# Patient Record
Sex: FEMALE | Race: BLACK OR AFRICAN AMERICAN
[De-identification: names, ages, dates, MRNs, and addresses within clinical notes are randomized per-mention and may not be internally consistent; named-entity substitution may affect disease eponyms.]

---

## 2018-10-14 ENCOUNTER — HOSPITAL ENCOUNTER (EMERGENCY)
Dept: HOSPITAL 39 - ER | Age: 63
Discharge: ANOTHER HOSPITAL | End: 2018-10-14
Payer: SELF-PAY

## 2018-10-14 VITALS — DIASTOLIC BLOOD PRESSURE: 74 MMHG | TEMPERATURE: 100.7 F | SYSTOLIC BLOOD PRESSURE: 140 MMHG

## 2018-10-14 VITALS — OXYGEN SATURATION: 100 %

## 2018-10-14 DIAGNOSIS — R00.0: ICD-10-CM

## 2018-10-14 DIAGNOSIS — J96.00: Primary | ICD-10-CM

## 2018-10-14 DIAGNOSIS — Z88.5: ICD-10-CM

## 2018-10-14 DIAGNOSIS — I10: ICD-10-CM

## 2018-10-14 DIAGNOSIS — K21.9: ICD-10-CM

## 2018-10-14 DIAGNOSIS — R41.82: ICD-10-CM

## 2018-10-14 DIAGNOSIS — Z99.11: ICD-10-CM

## 2018-10-14 DIAGNOSIS — G12.21: ICD-10-CM

## 2018-10-14 DIAGNOSIS — Z88.6: ICD-10-CM

## 2018-10-14 PROCEDURE — 82550 ASSAY OF CK (CPK): CPT

## 2018-10-14 PROCEDURE — 70450 CT HEAD/BRAIN W/O DYE: CPT

## 2018-10-14 PROCEDURE — 84484 ASSAY OF TROPONIN QUANT: CPT

## 2018-10-14 PROCEDURE — 80048 BASIC METABOLIC PNL TOTAL CA: CPT

## 2018-10-14 PROCEDURE — 85025 COMPLETE CBC W/AUTO DIFF WBC: CPT

## 2018-10-14 PROCEDURE — 94002 VENT MGMT INPAT INIT DAY: CPT

## 2018-10-14 PROCEDURE — 82553 CREATINE MB FRACTION: CPT

## 2018-10-14 PROCEDURE — 71045 X-RAY EXAM CHEST 1 VIEW: CPT

## 2018-10-14 PROCEDURE — 93005 ELECTROCARDIOGRAM TRACING: CPT

## 2018-10-14 PROCEDURE — 85730 THROMBOPLASTIN TIME PARTIAL: CPT

## 2018-10-14 PROCEDURE — 85610 PROTHROMBIN TIME: CPT

## 2018-10-14 NOTE — RAD
EXAM:



Single view chest.



INDICATION:  



Dyspnea.



COMPARISON: 



Chest x-ray: None.



FINDINGS:



Cardiac silhouette:  Unremarkable.



Courtney:  Unremarkable.



Lobar consolidation:  None.

Pleural effusion:  None.

Pneumothorax:  None.

Other:  Right PICC terminates within the SVC. A tracheostomy tube

is in place



Bones:  Unremarkable.



Other:  None.



IMPRESSION:



1. No acute cardiopulmonary process.



Electronically signed by:  Wilbert Dumont MD  10/14/2018 5:16 AM CDT

Workstation: [id number]

## 2018-10-14 NOTE — CT
CT head without contrast on 10/14/2018 



CLINICAL INDICATION: Altered mental status status post CPR 



TECHNIQUE: Multiple axial images are obtained throughout the head

without the administration of contrast. This exam was performed

according to our departmental dose-optimization program, which

includes automated exposure control, adjustment of the mA and/or

kV according to patient size and/or use of iterative

reconstruction technique. 

Total DLP is 859.97 mGy*cm.



COMPARISON: None



FINDINGS: There is mild generalized cerebral atrophy. There is no

hydrocephalus. There is no CT evidence of acute infarct. There is

no hemorrhage. Good gray-white differentiation is noted. There

are no abnormal extra-axial fluid collections. There is no mass,

mass effect or midline shift. No bony abnormality is noted.



IMPRESSION: Atrophy with no acute intracranial abnormality.



Electronically signed by:  Anton Britton  10/14/2018 5:32 AM

CDT Workstation: [id number]

## 2018-10-14 NOTE — ED.PDOC
History of Present Illness





- General


Chief Complaint: Respiratory Problem


Stated Complaint: resp distress, vent dependent, Hx ALS


Time Seen by Provider: 10/14/18 04:46


Source: Vital Signs reviewed, EMS


Exam Limitations: clinical condition, physical impairment





- History of Present Illness


Initial Comments: 





Reportedly she is a patient with ALS (vent dependent) who is new to a local 

facility. She had been in her usual state of health until tonight when the 

nurses heard her vent alarms. They found her with central cyanosis & pulseless. 

They started CPR & called EMS. EMS reports that when they arrived CPR was being 

performed but on a pulse check she had a pulse & a normal rhythm on the 

monitor. She was dyspneic but that resolved after they suctioned a mucus plug 

from her tube. Since then there have been no changes. However, by report her 

baseline mental status is that she can at least open her eyes spontaneously.


Timing/Duration: unsure


Severity: severe


Improving Factors: other - suctioning


Worsening Factors: nothing


Allergies/Adverse Reactions: 


Allergies





Aspirin Allergy (Verified 10/14/18 05:10)


 


Hydrocodone Allergy (Verified 10/14/18 05:10)


 


Tramadol Allergy (Verified 10/14/18 05:10)


 











Review of Systems





- Review of Systems


Constitutional: States: see HPI


EENTM: States: no symptoms reported


Respiratory: States: see HPI


Cardiology: States: see HPI


Gastrointestinal/Abdominal: States: no symptoms reported


Genitourinary: States: no symptoms reported


Musculoskeletal: States: no symptoms reported


Skin: States: no symptoms reported


Neurological: States: see HPI


Endocrine: States: no symptoms reported


Hematologic/Lymphatic: States: no symptoms reported


Unable to Obtain Due To: condition, clinical condition





Past Medical History (General)





- Patient Medical History


Hx Seizures: No


Hx Stroke: No


Hx Dementia: No


Hx Asthma: No


Hx of COPD: No


Hx Cardiac Disorders: No


Hx Congestive Heart Failure: No


Hx Pacemaker: No


Hx Hypertension: Yes


Hx Thyroid Disease: No


Hx Diabetes: No


Hx Gastroesophageal Reflux: Yes


Hx Renal Disease: No


Hx Cancer: No


Hx of HIV: No


Hx Hepatitis C: No


Hx MRSA: No


Surgical History: noncontributory





- Activities of Daily Living


Nursing Home/Assisted Living (if applicable):: [facility]





Family Medical History





- Family History


  ** Mother


Family History: Unknown





Physical Exam





- Physical Exam


General Appearance: Comfortable, No apparent distress, Lethargic


Eye Exam: bilateral normal


Ears, Nose, Throat: normal ENT inspection


Neck: normal inspection, other - trach


Respiratory: lungs clear, normal breath sounds, no respiratory distress, no 

accessory muscle use


Cardiovascular/Chest: regular rate, rhythm, no JVD, no murmur


Gastrointestinal/Abdominal: soft, no organomegaly, no pulsatile mass


Extremity: normal inspection, normal capillary refill


Neurologic: other - opens her eyes partially with a sternal rub


Skin Exam: normal color, warm/dry





Progress





- Progress


Progress: 





10/14/18 05:40


Erratic BPs (149 vs. 60). . No change in mental status. PICC line not 

functioning properly.


10/14/18 06:12


. Looking at the ceiling.





- Results/Orders


Results/Orders: 





Hgb 8


Hct 25


Tr 0.03





- EKG/XRAY/CT


EKG: Sinus, Tachy - ; nml axis, intervals, QRS, ST-T; artifact, no ST T 

wave changes


XRAY: chest - no acute process


CT: head: no acute process


CT Ordered: Yes





- Consult/PCP


Time Called: 06:25


Consult/PCP: Transfer accepted by Dr. Davidson.





Departure





- Departure


Clinical Impression: 


Respiratory failure


Qualifiers:


 Chronicity: acute Respiratory failure complication: unspecified whether with 

hypoxia or hypercapnia Qualified Code(s): J96.00 - Acute respiratory failure, 

unspecified whether with hypoxia or hypercapnia





Altered mental status


Qualifiers:


 Altered mental status type: stupor Qualified Code(s): R40.1 - Stupor





Time of Disposition: 06:26


Disposition: Transfer to Hospital





Critical Care Note





- Critical Care Note


Total Time (mins): 45